# Patient Record
Sex: FEMALE | Race: BLACK OR AFRICAN AMERICAN
[De-identification: names, ages, dates, MRNs, and addresses within clinical notes are randomized per-mention and may not be internally consistent; named-entity substitution may affect disease eponyms.]

---

## 2017-08-03 PROBLEM — Z00.00 ENCOUNTER FOR PREVENTIVE HEALTH EXAMINATION: Status: ACTIVE | Noted: 2017-08-03

## 2017-09-01 ENCOUNTER — APPOINTMENT (OUTPATIENT)
Dept: OPHTHALMOLOGY | Facility: CLINIC | Age: 27
End: 2017-09-01

## 2017-09-26 ENCOUNTER — APPOINTMENT (OUTPATIENT)
Dept: OPHTHALMOLOGY | Facility: CLINIC | Age: 27
End: 2017-09-26

## 2017-12-22 ENCOUNTER — APPOINTMENT (OUTPATIENT)
Dept: OPHTHALMOLOGY | Facility: CLINIC | Age: 27
End: 2017-12-22
Payer: MEDICAID

## 2017-12-22 PROCEDURE — 92004 COMPRE OPH EXAM NEW PT 1/>: CPT

## 2018-01-26 ENCOUNTER — APPOINTMENT (OUTPATIENT)
Dept: OPHTHALMOLOGY | Facility: CLINIC | Age: 28
End: 2018-01-26
Payer: COMMERCIAL

## 2018-01-26 PROCEDURE — 92012 INTRM OPH EXAM EST PATIENT: CPT

## 2018-03-01 ENCOUNTER — APPOINTMENT (OUTPATIENT)
Dept: OPHTHALMOLOGY | Facility: CLINIC | Age: 28
End: 2018-03-01
Payer: COMMERCIAL

## 2018-03-01 PROCEDURE — 92134 CPTRZ OPH DX IMG PST SGM RTA: CPT

## 2018-03-01 PROCEDURE — 92012 INTRM OPH EXAM EST PATIENT: CPT

## 2018-03-01 RX ORDER — KETOROLAC TROMETHAMINE 5 MG/ML
0.5 SOLUTION OPHTHALMIC
Qty: 1 | Refills: 5 | Status: ACTIVE | COMMUNITY
Start: 2018-03-01 | End: 1900-01-01

## 2018-04-19 ENCOUNTER — APPOINTMENT (OUTPATIENT)
Dept: OPHTHALMOLOGY | Facility: CLINIC | Age: 28
End: 2018-04-19
Payer: COMMERCIAL

## 2018-04-19 PROCEDURE — 92134 CPTRZ OPH DX IMG PST SGM RTA: CPT

## 2018-04-19 PROCEDURE — 92012 INTRM OPH EXAM EST PATIENT: CPT

## 2018-04-27 ENCOUNTER — RX RENEWAL (OUTPATIENT)
Age: 28
End: 2018-04-27

## 2018-04-27 RX ORDER — FLUOROMETHOLONE 2.5 MG/ML
0.25 SUSPENSION/ DROPS OPHTHALMIC 4 TIMES DAILY
Qty: 1 | Refills: 5 | Status: ACTIVE | COMMUNITY
Start: 2018-04-27 | End: 1900-01-01

## 2018-05-03 ENCOUNTER — APPOINTMENT (OUTPATIENT)
Dept: OPHTHALMOLOGY | Facility: CLINIC | Age: 28
End: 2018-05-03
Payer: COMMERCIAL

## 2018-05-03 PROCEDURE — 92012 INTRM OPH EXAM EST PATIENT: CPT

## 2018-05-03 PROCEDURE — 92134 CPTRZ OPH DX IMG PST SGM RTA: CPT

## 2018-05-04 ENCOUNTER — RX RENEWAL (OUTPATIENT)
Age: 28
End: 2018-05-04

## 2018-06-07 ENCOUNTER — APPOINTMENT (OUTPATIENT)
Dept: OPHTHALMOLOGY | Facility: CLINIC | Age: 28
End: 2018-06-07

## 2018-07-23 ENCOUNTER — APPOINTMENT (OUTPATIENT)
Dept: OPHTHALMOLOGY | Facility: CLINIC | Age: 28
End: 2018-07-23
Payer: COMMERCIAL

## 2018-07-23 DIAGNOSIS — H53.002 UNSPECIFIED AMBLYOPIA, LEFT EYE: ICD-10-CM

## 2018-07-23 PROCEDURE — 92012 INTRM OPH EXAM EST PATIENT: CPT

## 2018-07-23 RX ORDER — DIFLUPREDNATE 0.5 MG/ML
0.05 EMULSION OPHTHALMIC 4 TIMES DAILY
Qty: 1 | Refills: 5 | Status: ACTIVE | COMMUNITY
Start: 2018-04-19 | End: 1900-01-01

## 2018-08-06 ENCOUNTER — APPOINTMENT (OUTPATIENT)
Dept: OPHTHALMOLOGY | Facility: CLINIC | Age: 28
End: 2018-08-06

## 2018-09-13 ENCOUNTER — APPOINTMENT (OUTPATIENT)
Dept: OPHTHALMOLOGY | Facility: CLINIC | Age: 28
End: 2018-09-13
Payer: COMMERCIAL

## 2018-09-13 PROCEDURE — 92012 INTRM OPH EXAM EST PATIENT: CPT

## 2018-12-07 ENCOUNTER — APPOINTMENT (OUTPATIENT)
Dept: OPHTHALMOLOGY | Facility: CLINIC | Age: 28
End: 2018-12-07
Payer: COMMERCIAL

## 2018-12-07 PROCEDURE — 92012 INTRM OPH EXAM EST PATIENT: CPT

## 2019-03-05 ENCOUNTER — APPOINTMENT (OUTPATIENT)
Dept: OPHTHALMOLOGY | Facility: CLINIC | Age: 29
End: 2019-03-05
Payer: COMMERCIAL

## 2019-03-05 PROCEDURE — 92014 COMPRE OPH EXAM EST PT 1/>: CPT

## 2019-03-05 RX ORDER — ACETAZOLAMIDE 500 MG/1
500 CAPSULE ORAL
Qty: 60 | Refills: 3 | Status: ACTIVE | COMMUNITY
Start: 2019-03-05 | End: 1900-01-01

## 2019-03-15 ENCOUNTER — APPOINTMENT (OUTPATIENT)
Dept: OPHTHALMOLOGY | Facility: CLINIC | Age: 29
End: 2019-03-15
Payer: COMMERCIAL

## 2019-03-15 PROCEDURE — 92012 INTRM OPH EXAM EST PATIENT: CPT

## 2019-03-15 PROCEDURE — 76514 ECHO EXAM OF EYE THICKNESS: CPT

## 2019-03-15 PROCEDURE — 92014 COMPRE OPH EXAM EST PT 1/>: CPT

## 2019-03-15 PROCEDURE — 92083 EXTENDED VISUAL FIELD XM: CPT

## 2019-03-15 PROCEDURE — 92020 GONIOSCOPY: CPT

## 2019-03-15 PROCEDURE — 92133 CPTRZD OPH DX IMG PST SGM ON: CPT

## 2019-03-29 ENCOUNTER — APPOINTMENT (OUTPATIENT)
Dept: OPHTHALMOLOGY | Facility: CLINIC | Age: 29
End: 2019-03-29
Payer: COMMERCIAL

## 2019-03-29 DIAGNOSIS — R76.8 OTHER SPECIFIED ABNORMAL IMMUNOLOGICAL FINDINGS IN SERUM: ICD-10-CM

## 2019-03-29 DIAGNOSIS — H35.351 CYSTOID MACULAR DEGENERATION, RIGHT EYE: ICD-10-CM

## 2019-03-29 DIAGNOSIS — H16.9 UNSPECIFIED KERATITIS: ICD-10-CM

## 2019-03-29 DIAGNOSIS — H40.1121 PRIMARY OPEN-ANGLE GLAUCOMA, LEFT EYE, MILD STAGE: ICD-10-CM

## 2019-03-29 DIAGNOSIS — H17.9 UNSPECIFIED CORNEAL SCAR AND OPACITY: ICD-10-CM

## 2019-03-29 DIAGNOSIS — H40.052 OCULAR HYPERTENSION, LEFT EYE: ICD-10-CM

## 2019-03-29 PROCEDURE — 92014 COMPRE OPH EXAM EST PT 1/>: CPT

## 2019-03-29 PROCEDURE — 92012 INTRM OPH EXAM EST PATIENT: CPT

## 2019-04-12 ENCOUNTER — APPOINTMENT (OUTPATIENT)
Dept: OPHTHALMOLOGY | Facility: CLINIC | Age: 29
End: 2019-04-12
Payer: COMMERCIAL

## 2019-04-12 PROCEDURE — 92012 INTRM OPH EXAM EST PATIENT: CPT

## 2019-04-19 ENCOUNTER — APPOINTMENT (OUTPATIENT)
Dept: OPHTHALMOLOGY | Facility: CLINIC | Age: 29
End: 2019-04-19
Payer: COMMERCIAL

## 2019-04-19 DIAGNOSIS — T38.0X5A GLAUCOMA SECONDARY TO DRUGS, LEFT EYE, STAGE UNSPECIFIED: ICD-10-CM

## 2019-04-19 DIAGNOSIS — H40.62X0 GLAUCOMA SECONDARY TO DRUGS, LEFT EYE, STAGE UNSPECIFIED: ICD-10-CM

## 2019-04-19 DIAGNOSIS — H20.13 CHRONIC IRIDOCYCLITIS, BILATERAL: ICD-10-CM

## 2019-04-19 PROCEDURE — 92012 INTRM OPH EXAM EST PATIENT: CPT

## 2019-04-19 RX ORDER — DORZOLAMIDE HYDROCHLORIDE TIMOLOL MALEATE 20; 5 MG/ML; MG/ML
22.3-6.8 SOLUTION/ DROPS OPHTHALMIC TWICE DAILY
Qty: 1 | Refills: 5 | Status: ACTIVE | COMMUNITY
Start: 2019-03-29 | End: 1900-01-01

## 2019-04-19 RX ORDER — BRIMONIDINE TARTRATE 1.5 MG/ML
0.15 SOLUTION/ DROPS OPHTHALMIC TWICE DAILY
Qty: 1 | Refills: 5 | Status: ACTIVE | COMMUNITY
Start: 2018-01-26 | End: 1900-01-01

## 2019-04-19 RX ORDER — LATANOPROST/PF 0.005 %
0.01 DROPS OPHTHALMIC (EYE)
Qty: 1 | Refills: 5 | Status: ACTIVE | COMMUNITY
Start: 2018-04-19 | End: 1900-01-01

## 2019-05-24 ENCOUNTER — APPOINTMENT (OUTPATIENT)
Dept: OPHTHALMOLOGY | Facility: CLINIC | Age: 29
End: 2019-05-24
Payer: COMMERCIAL

## 2019-05-24 ENCOUNTER — NON-APPOINTMENT (OUTPATIENT)
Age: 29
End: 2019-05-24

## 2019-05-24 PROCEDURE — 92012 INTRM OPH EXAM EST PATIENT: CPT

## 2019-06-12 ENCOUNTER — RX RENEWAL (OUTPATIENT)
Age: 29
End: 2019-06-12

## 2019-06-12 RX ORDER — PREDNISOLONE ACETATE 10 MG/ML
1 SUSPENSION/ DROPS OPHTHALMIC
Qty: 1 | Refills: 2 | Status: ACTIVE | COMMUNITY
Start: 2018-05-04 | End: 1900-01-01

## 2019-06-12 RX ORDER — PREDNISONE 10 MG/1
10 TABLET ORAL
Qty: 42 | Refills: 1 | Status: ACTIVE | COMMUNITY
Start: 2019-03-29 | End: 1900-01-01

## 2019-06-12 RX ORDER — OFLOXACIN 3 MG/ML
0.3 SOLUTION/ DROPS OPHTHALMIC
Qty: 1 | Refills: 3 | Status: ACTIVE | COMMUNITY
Start: 2019-06-12 | End: 1900-01-01

## 2019-06-18 ENCOUNTER — NON-APPOINTMENT (OUTPATIENT)
Age: 29
End: 2019-06-18

## 2019-06-18 ENCOUNTER — OUTPATIENT (OUTPATIENT)
Dept: OUTPATIENT SERVICES | Facility: HOSPITAL | Age: 29
LOS: 1 days | Discharge: HOME | End: 2019-06-18

## 2019-06-18 ENCOUNTER — APPOINTMENT (OUTPATIENT)
Dept: OPHTHALMOLOGY | Facility: AMBULATORY SURGERY CENTER | Age: 29
End: 2019-06-18
Payer: COMMERCIAL

## 2019-06-18 PROCEDURE — 66150 GLAUCOMA SURGERY: CPT | Mod: LT

## 2019-06-19 ENCOUNTER — NON-APPOINTMENT (OUTPATIENT)
Age: 29
End: 2019-06-19

## 2019-06-19 ENCOUNTER — APPOINTMENT (OUTPATIENT)
Dept: OPHTHALMOLOGY | Facility: CLINIC | Age: 29
End: 2019-06-19
Payer: COMMERCIAL

## 2019-06-19 PROCEDURE — 99024 POSTOP FOLLOW-UP VISIT: CPT

## 2019-06-21 ENCOUNTER — APPOINTMENT (OUTPATIENT)
Dept: OPHTHALMOLOGY | Facility: CLINIC | Age: 29
End: 2019-06-21
Payer: COMMERCIAL

## 2019-06-21 ENCOUNTER — NON-APPOINTMENT (OUTPATIENT)
Age: 29
End: 2019-06-21

## 2019-06-21 PROCEDURE — 99024 POSTOP FOLLOW-UP VISIT: CPT

## 2019-06-28 ENCOUNTER — NON-APPOINTMENT (OUTPATIENT)
Age: 29
End: 2019-06-28

## 2019-06-28 ENCOUNTER — APPOINTMENT (OUTPATIENT)
Dept: OPHTHALMOLOGY | Facility: CLINIC | Age: 29
End: 2019-06-28
Payer: COMMERCIAL

## 2019-06-28 PROCEDURE — 99024 POSTOP FOLLOW-UP VISIT: CPT

## 2019-06-28 PROCEDURE — 67515 INJECT/TREAT EYE SOCKET: CPT | Mod: 58,LT

## 2019-07-29 ENCOUNTER — APPOINTMENT (OUTPATIENT)
Dept: OPHTHALMOLOGY | Facility: CLINIC | Age: 29
End: 2019-07-29
Payer: COMMERCIAL

## 2019-07-29 ENCOUNTER — NON-APPOINTMENT (OUTPATIENT)
Age: 29
End: 2019-07-29

## 2019-07-29 PROCEDURE — 99024 POSTOP FOLLOW-UP VISIT: CPT

## 2019-08-05 ENCOUNTER — APPOINTMENT (OUTPATIENT)
Dept: OPHTHALMOLOGY | Facility: CLINIC | Age: 29
End: 2019-08-05

## 2019-08-19 ENCOUNTER — APPOINTMENT (OUTPATIENT)
Dept: OPHTHALMOLOGY | Facility: CLINIC | Age: 29
End: 2019-08-19
Payer: MEDICAID

## 2019-08-19 ENCOUNTER — NON-APPOINTMENT (OUTPATIENT)
Age: 29
End: 2019-08-19

## 2019-08-19 PROCEDURE — 99024 POSTOP FOLLOW-UP VISIT: CPT

## 2019-08-30 ENCOUNTER — APPOINTMENT (OUTPATIENT)
Dept: OPHTHALMOLOGY | Facility: CLINIC | Age: 29
End: 2019-08-30
Payer: MEDICAID

## 2019-08-30 ENCOUNTER — NON-APPOINTMENT (OUTPATIENT)
Age: 29
End: 2019-08-30

## 2019-08-30 PROCEDURE — 68200 TREAT EYELID BY INJECTION: CPT | Mod: 58,LT

## 2019-08-30 PROCEDURE — 99024 POSTOP FOLLOW-UP VISIT: CPT

## 2019-09-20 ENCOUNTER — NON-APPOINTMENT (OUTPATIENT)
Age: 29
End: 2019-09-20

## 2019-09-20 ENCOUNTER — APPOINTMENT (OUTPATIENT)
Dept: OPHTHALMOLOGY | Facility: CLINIC | Age: 29
End: 2019-09-20
Payer: MEDICAID

## 2019-09-20 PROCEDURE — 92014 COMPRE OPH EXAM EST PT 1/>: CPT

## 2019-10-04 ENCOUNTER — NON-APPOINTMENT (OUTPATIENT)
Age: 29
End: 2019-10-04

## 2019-10-04 ENCOUNTER — APPOINTMENT (OUTPATIENT)
Dept: OPHTHALMOLOGY | Facility: CLINIC | Age: 29
End: 2019-10-04
Payer: MEDICAID

## 2019-10-04 PROCEDURE — 92012 INTRM OPH EXAM EST PATIENT: CPT

## 2019-10-05 ENCOUNTER — HOSPITAL ENCOUNTER (EMERGENCY)
Dept: HOSPITAL 74 - JERFT | Age: 29
Discharge: HOME | End: 2019-10-05
Payer: COMMERCIAL

## 2019-10-05 VITALS — SYSTOLIC BLOOD PRESSURE: 100 MMHG | DIASTOLIC BLOOD PRESSURE: 73 MMHG | TEMPERATURE: 97.8 F | HEART RATE: 96 BPM

## 2019-10-05 VITALS — BODY MASS INDEX: 21.6 KG/M2

## 2019-10-05 DIAGNOSIS — D50.9: ICD-10-CM

## 2019-10-05 DIAGNOSIS — J40: Primary | ICD-10-CM

## 2019-10-05 PROCEDURE — 3E0F7GC INTRODUCTION OF OTHER THERAPEUTIC SUBSTANCE INTO RESPIRATORY TRACT, VIA NATURAL OR ARTIFICIAL OPENING: ICD-10-PCS

## 2019-10-05 RX ADMIN — IPRATROPIUM BROMIDE AND ALBUTEROL SULFATE SCH AMP: .5; 3 SOLUTION RESPIRATORY (INHALATION) at 11:53

## 2019-10-05 RX ADMIN — IPRATROPIUM BROMIDE AND ALBUTEROL SULFATE SCH AMP: .5; 3 SOLUTION RESPIRATORY (INHALATION) at 12:45

## 2019-10-05 RX ADMIN — IPRATROPIUM BROMIDE AND ALBUTEROL SULFATE SCH AMP: .5; 3 SOLUTION RESPIRATORY (INHALATION) at 12:34

## 2019-10-05 RX ADMIN — IPRATROPIUM BROMIDE AND ALBUTEROL SULFATE SCH AMP: .5; 3 SOLUTION RESPIRATORY (INHALATION) at 12:05

## 2019-10-05 NOTE — PDOC
History of Present Illness





- General


Chief Complaint: Respiratory


Stated Complaint: COUGH


Time Seen by Provider: 10/05/19 11:23


History Source: Patient


Exam Limitations: No Limitations





Past History





- Travel


Traveled outside of the country in the last 30 days: No


Close contact w/someone who was outside of country & ill: No





- Past Medical History


Allergies/Adverse Reactions: 


 Allergies











Allergy/AdvReac Type Severity Reaction Status Date / Time


 


No Known Allergies Allergy   Verified 10/05/19 11:06











Home Medications: 


Ambulatory Orders





Albuterol Sulfate Inhaler - [Ventolin HFA Inhaler -] 1 - 2 inh PO Q4H #1 

inhaler 10/05/19 


Guaifenesin AC [Robitussin AC] 10 ml PO Q8H #150 ud MDD 3 10/05/19 


Methylprednisolone [Medrol Dose Hoang] 4 mg PO ASDIR #21 tablet 10/05/19 








Anemia: Yes (IRON DEFICIENCY)





- Psycho Social/Smoking Cessation Hx


Smoking History: Never smoked


Have you smoked in the past 12 months: No


Hx Alcohol Use: No


Drug/Substance Use Hx: No


Substance Use Type: None





**Review of Systems





- Review of Systems


Able to Perform ROS?: Yes


Comments:: 





10/05/19 11:35


CONSTITUTIONAL: 


Absent: fever, chills, diaphoresis, generalized weakness, malaise, loss of 

appetite


HEENT: 


Absent: rhinorrhea, nasal congestion, throat pain, throat swelling, difficulty 

swallowing, mouth swelling, ear pain, eye pain, visual Changes


CARDIOVASCULAR: 


Absent: chest pain, loss of consciousness, palpitations, irregular heart rate, 

peripheral edema


RESPIRATORY: 


Present: productive cough, wheezing. Absent: shortness of breath, dyspnea with 

exertion, orthopnea, stridor, hemoptysis


GASTROINTESTINAL:


Absent: abdominal pain, abdominal distension, nausea, vomiting, diarrhea, 

constipation, melena, hematochezia


GENITOURINARY: 


Absent: dysuria, frequency, urgency, hesitancy, hematuria, flank pain, genital 

pain


MUSCULOSKELETAL: 


Absent: myalgia, arthralgia, joint swelling


SKIN: 


Absent: rash, itching, pallor


HEMATOLOGIC/IMMUNOLOGIC: 


Absent: easy bleeding, easy bruising, lymphadenopathy, frequent infections


ENDOCRINE:


Absent: unexplained weight gain, unexplained weight loss, heat intolerance, 

cold intolerance


NEUROLOGIC: 


Absent: headache, focal weakness or paresthesias, dizziness, unsteady gait, 

seizure, mental status changes, bladder or bowel incontinence


PSYCHIATRIC: 


Absent: anxiety, depression, suicidal or homicidal ideation, hallucinations.


Is the patient limited English proficient: No





*Physical Exam





- Vital Signs


 Last Vital Signs











Temp Pulse Resp BP Pulse Ox


 


 97.8 F   96 H  18   100/73   96 


 


 10/05/19 11:07  10/05/19 11:07  10/05/19 11:07  10/05/19 11:07  10/05/19 11:07














- Physical Exam


Comments: 





10/05/19 11:40


GENERAL:


Well developed, well nourished. Awake and alert. No acute distress.


HEENT:


Normocephalic, atraumatic. PERRLA, EOMI. No conjunctival pallor. Sclera are non-

icteric. Moist mucous membranes. Oropharynx is clear.


NECK: 


Supple. Full ROM. No JVD. Carotid pulses 2+ and symmetric, without bruits. No 

thyromegaly. No lymphadenopathy.


CARDIOVASCULAR:


Regular rate and rhythm. No murmurs, rubs, or gallops. Distal pulses are 2+ and 

symmetric. 


PULMONARY: 


No evidence of respiratory distress. Lungs with scattered expiratory wheezing. 

Wet cough observed. No rales or rhonchi.


ABDOMINAL:


Soft. Non-tender. Non-distended. No rebound or guarding. No organomegaly. 

Normoactive bowel sounds. 


MUSCULOSKELETAL 


Normal range of motion at all joints. No bony deformities or tenderness. No CVA 

tenderness.


EXTREMITIES: 


No cyanosis. No clubbing. No edema. No calf tenderness.


SKIN: 


Warm and dry. Normal capillary refill. No rashes. No jaundice. 


NEUROLOGICAL: 


Alert, awake, appropriate. Cranial nerves 2-12 intact. No deficits to light 

touch and temperature in face, upper extremities and lower extremities. No 

motor deficits in the in face, upper extremities and lower extremities. 

Normoreflexic in the upper and lower extremities. Normal speech. Toes are down-

going bilaterally. Gait is normal without ataxia.


PSYCHIATRIC: 


Cooperative. Good eye contact. Appropriate mood and affect.





Medical Decision Making





- Medical Decision Making





10/05/19 11:46


Patient is a 29-year-old female with no past medical history who presents to 

the ER today for 1 week of wet cough.  She states that she is unsure as to what 

color her sputum is as she has not been able to cough up anything.  She went to 

see her primary care doctor today who sent her to the ER due to wheezing.  She 

states that she had some fatigue early in the week however now she just has the 

cough.  Denies fevers, chills, sore throat, earache, chest pain, difficulty 

breathing, nausea, vomiting and diarrhea.  Her last menstrual cycle was 3 weeks 

ago and she denies any chance of pregnancy.





A/P: Cough


On exam patient with scattered expiratory wheezes and fair aeration of the 

bases.


Wet cough observed without sputum production


Duo nebs, steroids and chest x-ray ordered


Reevaluate





10/05/19 13:27


Lung sounds now clear at the bases. No wheezing


X-ray with no acute pathology


Likely bronchitis


DC home with supportive therapy


I discussed the physical exam findings, ancillary test results and final 

diagnoses with the patient. I answered all of the patient's questions. The 

patient was satisfied with the care received and felt comfortable with the 

discharge plan and treatment plan.  The Patient agrees to follow up with the 

primary care physician/specialist within 24-72 hours. Return precautions were 

given.





Discharge





- Discharge Information


Problems reviewed: Yes


Clinical Impression/Diagnosis: 


 Bronchitis





Condition: Stable


Disposition: HOME





- Admission


No





- Follow up/Referral


Referrals: 


Remedios Anaya [Primary Care Provider] - 





- Patient Discharge Instructions


Patient Printed Discharge Instructions:  DI for Acute Bronchitis


Additional Instructions: 


You have bronchitis.


Your chest x-ray was negative for pneumonia.


Please use the inhaler every 4 hours for the next week to help with your cough.


Continue taking the prednisone daily for the next 4 days.


You may take the Robitussin with codeine at night before bed to help with your 

cough. Do not drive or drink alcohol after taking this medication as it may 

make you sleepy.


Please follow up with your primary care doctor in 1 week if your symptoms are 

not improving.





Return to the emergency department if you have fevers, chills, worsening cough, 

chest pain, worsening shortness of breath or if you have any changes in your 

symptoms.





- Post Discharge Activity


Work/Back to School Note:  Back to Work

## 2019-11-01 ENCOUNTER — APPOINTMENT (OUTPATIENT)
Dept: OPHTHALMOLOGY | Facility: CLINIC | Age: 29
End: 2019-11-01

## 2019-12-13 ENCOUNTER — APPOINTMENT (OUTPATIENT)
Age: 29
End: 2019-12-13
Payer: MEDICARE

## 2019-12-13 ENCOUNTER — NON-APPOINTMENT (OUTPATIENT)
Age: 29
End: 2019-12-13

## 2019-12-13 PROCEDURE — 92012 INTRM OPH EXAM EST PATIENT: CPT

## 2020-02-07 ENCOUNTER — NON-APPOINTMENT (OUTPATIENT)
Age: 30
End: 2020-02-07

## 2020-02-07 ENCOUNTER — APPOINTMENT (OUTPATIENT)
Dept: OPHTHALMOLOGY | Facility: CLINIC | Age: 30
End: 2020-02-07
Payer: MEDICARE

## 2020-02-07 PROCEDURE — 92134 CPTRZ OPH DX IMG PST SGM RTA: CPT

## 2020-02-07 PROCEDURE — 92012 INTRM OPH EXAM EST PATIENT: CPT

## 2020-02-07 PROCEDURE — 92083 EXTENDED VISUAL FIELD XM: CPT

## 2020-02-09 ENCOUNTER — HOSPITAL ENCOUNTER (EMERGENCY)
Dept: HOSPITAL 74 - JERFT | Age: 30
Discharge: HOME | End: 2020-02-09
Payer: COMMERCIAL

## 2020-02-09 VITALS — DIASTOLIC BLOOD PRESSURE: 73 MMHG | HEART RATE: 111 BPM | SYSTOLIC BLOOD PRESSURE: 139 MMHG | TEMPERATURE: 98.6 F

## 2020-02-09 VITALS — BODY MASS INDEX: 20.4 KG/M2
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
## 2020-02-09 DIAGNOSIS — T78.40XA: Primary | ICD-10-CM

## 2020-02-09 PROCEDURE — 3E0233Z INTRODUCTION OF ANTI-INFLAMMATORY INTO MUSCLE, PERCUTANEOUS APPROACH: ICD-10-PCS | Performed by: STUDENT IN AN ORGANIZED HEALTH CARE EDUCATION/TRAINING PROGRAM

## 2020-02-09 NOTE — PDOC
History of Present Illness





- General


Chief Complaint: Sore Throat


Stated Complaint: THROAT PROBLEM


Time Seen by Provider: 02/09/20 18:32


History Source: Patient


Exam Limitations: No Limitations





- History of Present Illness


Initial Comments: 





02/09/20 18:48


Patient is a 29-year-old female who presents to the ED with complaint of 

stating that she feels as if her throat is closing.  She states that it started 

about 30 minutes ago.  She denies any new soaps, deodorants, body washes, 

perfumes or foods.  The last thing she ate was rice, cabbage and ox tails all 

made at home.  She has not eaten out today.  She states that this is never 

happened to her before.  She does admit to being able to swallow her own 

fluids.  She denies any shortness of breath.  She denies any lip or tongue 

swelling.





Past History





- Past Medical History


Allergies/Adverse Reactions: 


 Allergies











Allergy/AdvReac Type Severity Reaction Status Date / Time


 


No Known Allergies Allergy   Verified 02/09/20 18:29











Home Medications: 


Ambulatory Orders





Albuterol Sulfate Inhaler - [Ventolin HFA Inhaler -] 1 - 2 inh PO Q4H #1 

inhaler 10/05/19 


Guaifenesin AC [Robitussin AC] 10 ml PO Q8H #150 ud MDD 3 10/05/19 


Methylprednisolone [Medrol Dose Hoang] 4 mg PO ASDIR #21 tablet 10/05/19 


Cetirizine HCl [Zyrtec -] 10 mg PO DAILY #5 tablet 02/09/20 


Famotidine [Pepcid] 20 mg PO BID 5 Days #10 tablet 02/09/20 


predniSONE [Deltasone -] 60 mg PO DAILY #12 tablet 02/09/20 








Anemia: Yes (IRON DEFICIENCY)


COPD: No





- Psycho Social/Smoking Cessation Hx


Smoking History: Never smoked


Have you smoked in the past 12 months: No


Hx Alcohol Use: No


Drug/Substance Use Hx: Yes (marajuna)


Substance Use Type: None





**Review of Systems





- Review of Systems


Comments:: 





02/09/20 18:51


- Review of Systems


Able to Perform ROS?: Yes


Constitutional: No: Fever, Chills, Loss of Appetite, Night Sweats, Weakness


HEENTM: No: Eye Pain, Vision changes, Ear Pain, Throat Pain, Mouth Pain, 

Difficulty Swallowing, positive: throat Swelling


Respiratory: No: Cough, Shortness of Breath, Wheezing, Sputum Production


Cardiac (ROS): No: Chest Pain, Chest Tightness, Palpitations, Irregular Heart 

Beat, Edema


ABD/GI: No: Nausea, Vomiting, Abdominal Pain, Diarrhea


Musculoskeletal: No: Muscle Pain, Back Pain, Joint Pain, Muscle Weakness, Neck 

Pain


Integumentary: No: Lesions, Rash


Neurological: No: Headache, Numbness, Tingling, Weakness, Speech Difficulties





*Physical Exam





- Vital Signs


 Last Vital Signs











Temp Pulse Resp BP Pulse Ox


 


 98.6 F   111 H  18   139/73   100 


 


 02/09/20 18:26  02/09/20 18:26  02/09/20 18:26  02/09/20 18:26  02/09/20 18:26














- Physical Exam





02/09/20 18:52


- Physical Exam


General Appearance:  Nourished, Appropriately Dressed, No Distress


HEENT: EOMI, Normal Voice, No Pharyngeal Erythema, No Muffled/Hoarse voice, No 

Tonsillar Exudate, No Tonsillar Erythema, No Nasal Congestion, No Rhinorrhea, 

Hearing Grossly Normal, TMs Normal, No TM Bulging, No TM Dullness, No TM 

Erythema; no pharyngeal or tonsillar edema appreciated.  Uvula midline and 

without edema.  No posterior pharynx edema appreciated.  No swelling of the 

soft palate appreciated no stridor.  Patient tolerating her own secretions. 


Neck: Supple, No Lymphadenopathy (R), No Lymphadenopathy (L), No Rigidity, No 

Decreased range of motion


Respiratory/Chest: Lungs Clear, Normal Breath Sounds.  No Respiratory Distress, 

No Accessory Muscle Use; good air entry bilaterally.  No wheezes/rales/rhonchi.

  No adventitious lung sounds.  No retractions.


Cardiovascular: Regular Rhythm, Regular Rate, S1, S2


Gastrointestinal/Abdominal: Normal Bowel Sounds, Soft.  Non-tender, No Guarding

, No Rebound, No Rigidity


Musculoskeletal: Normal Inspection.  No Decreased Range of Motion


Extremity: Normal Capillary Refill, Normal Inspection


Integumentary:  Normal Color, Dry.  No Rash


Neurologic: CNs II-XII NML intact, Fully Oriented, Alert, Normal Mood/Affect, 

Normal Response





Medical Decision Making





- Medical Decision Making





02/09/20 18:53


Assessment: Patient is a 29-year-old female with sensation of her throat 

closing.





Plan:


-Although there are no findings of airway/throat/pharyngeal edema, we will 

treat the patient's symptoms as an allergic reaction.


-Decadron IM given


-Benadryl p.o. given


-Pepcid p.o. given


-Patient tolerated a p.o. challenge in the ED without difficulty.


-Will observe and reassess.


02/09/20 19:22


The patient states that she is feeling much better and she is eager to go home.

  She states she does not feel like she has any throat swelling at this time.  

She is tolerating her own secretions and speaking in full sentences.  Repeat 

evaluation of the patient's throat shows no pharyngeal or tonsillar edema, 

uvula midline and without edema and airway is patent.  There is no stridor.  

The patient will be discharged with prescriptions for prednisone, Pepcid and 

Zyrtec.  She has been made aware that she can also take Benadryl over-the-

counter.  She has been given strict return precautions such as difficulty 

swallowing, throat swelling, difficulty speaking, shortness of breath, tongue 

swelling or lip swelling.  She understands and agrees with this treatment and 

plan and the patient is stable for discharge.





Discharge





- Discharge Information


Problems reviewed: Yes


Clinical Impression/Diagnosis: 


 Sensation of swollen throat





Condition: Stable


Disposition: HOME





- Additional Discharge Information


Prescriptions: 


Cetirizine HCl [Zyrtec -] 10 mg PO DAILY #5 tablet


Famotidine [Pepcid] 20 mg PO BID 5 Days #10 tablet


predniSONE [Deltasone -] 60 mg PO DAILY #12 tablet





- Follow up/Referral


Referrals: 


Remedios Anaya [Primary Care Provider] - 24 hours (Follow up with your 

primary doctor tomorrow, 2/10/20)





- Patient Discharge Instructions


Patient Printed Discharge Instructions:  DI for General Allergic Reactions, DI 

for Angioedema


Additional Instructions: 


Be sure to drink plenty of fluids.  Take the medications as prescribed and 

complete the entire course of all medications.  Be sure to return immediately 

to the emergency department for difficulty swallowing, throat swelling, 

difficulty speaking, shortness of breath, tongue swelling or lip swelling.  

Follow-up with your primary doctor within 24 hours for repeat evaluation.





- Post Discharge Activity


Work/Back to School Note:  Back to Work

## 2020-05-01 ENCOUNTER — APPOINTMENT (OUTPATIENT)
Dept: OPHTHALMOLOGY | Facility: CLINIC | Age: 30
End: 2020-05-01
Payer: MEDICARE

## 2020-05-01 ENCOUNTER — NON-APPOINTMENT (OUTPATIENT)
Age: 30
End: 2020-05-01

## 2020-05-01 PROCEDURE — 99442: CPT

## 2020-07-24 ENCOUNTER — APPOINTMENT (OUTPATIENT)
Dept: OPHTHALMOLOGY | Facility: CLINIC | Age: 30
End: 2020-07-24

## 2021-06-05 ENCOUNTER — HOSPITAL ENCOUNTER (EMERGENCY)
Dept: HOSPITAL 74 - JER | Age: 31
Discharge: HOME | End: 2021-06-05
Payer: COMMERCIAL

## 2021-06-05 VITALS — TEMPERATURE: 98.6 F | HEART RATE: 101 BPM | DIASTOLIC BLOOD PRESSURE: 88 MMHG | SYSTOLIC BLOOD PRESSURE: 122 MMHG

## 2021-06-05 VITALS — BODY MASS INDEX: 24.5 KG/M2

## 2021-06-05 DIAGNOSIS — S93.401A: Primary | ICD-10-CM

## 2021-06-05 PROCEDURE — 3E0233Z INTRODUCTION OF ANTI-INFLAMMATORY INTO MUSCLE, PERCUTANEOUS APPROACH: ICD-10-PCS

## 2023-01-12 ENCOUNTER — OFFICE (OUTPATIENT)
Dept: URBAN - METROPOLITAN AREA CLINIC 121 | Facility: CLINIC | Age: 33
Setting detail: OPHTHALMOLOGY
End: 2023-01-12
Payer: COMMERCIAL

## 2023-01-12 DIAGNOSIS — H40.1123: ICD-10-CM

## 2023-01-12 DIAGNOSIS — H21.541: ICD-10-CM

## 2023-01-12 DIAGNOSIS — H25.89: ICD-10-CM

## 2023-01-12 DIAGNOSIS — H20.011: ICD-10-CM

## 2023-01-12 PROCEDURE — 99213 OFFICE O/P EST LOW 20 MIN: CPT | Performed by: OPHTHALMOLOGY

## 2023-01-12 ASSESSMENT — REFRACTION_AUTOREFRACTION
OD_AXIS: 120
OS_SPHERE: -15.50
OD_SPHERE: -8.75
OS_CYLINDER: +7.25
OS_AXIS: 90
OD_CYLINDER: +5.75

## 2023-01-12 ASSESSMENT — REFRACTION_MANIFEST
OS_AXIS: 90
OD_VA1: 20/60+2
OS_CYLINDER: +7.25
OS_SPHERE: -15.50
OD_AXIS: 120
OD_CYLINDER: +5.75
OD_SPHERE: -8.75
OS_VA1: 20/50

## 2023-01-12 ASSESSMENT — SPHEQUIV_DERIVED
OS_SPHEQUIV: -11.875
OS_SPHEQUIV: -11.875
OD_SPHEQUIV: -5.875
OD_SPHEQUIV: -5.875

## 2023-01-12 ASSESSMENT — TONOMETRY
OD_IOP_MMHG: 18
OS_IOP_MMHG: 16

## 2023-01-12 ASSESSMENT — CONFRONTATIONAL VISUAL FIELD TEST (CVF)
OS_FINDINGS: FULL
OD_FINDINGS: FULL

## 2023-01-12 ASSESSMENT — CORNEAL SURGICAL SCARRING: OS_SCARRING: STROMAL

## 2023-01-12 ASSESSMENT — REFRACTION_CURRENTRX
OD_AXIS: 119
OS_AXIS: 86
OS_OVR_VA: 20/
OD_SPHERE: -7.50
OD_OVR_VA: 20/
OS_SPHERE: -15.25
OS_CYLINDER: +6.50
OD_CYLINDER: +5.00

## 2023-01-12 ASSESSMENT — VISUAL ACUITY
OS_BCVA: 20/100
OD_BCVA: 20/50

## 2023-02-11 ENCOUNTER — HOSPITAL ENCOUNTER (EMERGENCY)
Dept: HOSPITAL 74 - JERFT | Age: 33
Discharge: HOME | End: 2023-02-11
Payer: COMMERCIAL

## 2023-02-11 VITALS
SYSTOLIC BLOOD PRESSURE: 134 MMHG | HEART RATE: 110 BPM | TEMPERATURE: 98 F | DIASTOLIC BLOOD PRESSURE: 92 MMHG | RESPIRATION RATE: 18 BRPM

## 2023-02-11 VITALS — BODY MASS INDEX: 27.4 KG/M2
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
## 2023-02-11 DIAGNOSIS — S82.831A: Primary | ICD-10-CM

## 2024-01-05 ENCOUNTER — OFFICE (OUTPATIENT)
Dept: URBAN - METROPOLITAN AREA CLINIC 30 | Facility: CLINIC | Age: 34
Setting detail: OPHTHALMOLOGY
End: 2024-01-05
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
## 2024-01-05 PROCEDURE — NO SHOW FE NO SHOW FEE: Performed by: OPHTHALMOLOGY

## 2024-01-12 ENCOUNTER — OFFICE (OUTPATIENT)
Dept: URBAN - METROPOLITAN AREA CLINIC 29 | Facility: CLINIC | Age: 34
Setting detail: OPHTHALMOLOGY
End: 2024-01-12
Payer: COMMERCIAL

## 2024-01-12 DIAGNOSIS — H35.033: ICD-10-CM

## 2024-01-12 DIAGNOSIS — H20.011: ICD-10-CM

## 2024-01-12 DIAGNOSIS — H40.1114: ICD-10-CM

## 2024-01-12 DIAGNOSIS — H40.1123: ICD-10-CM

## 2024-01-12 DIAGNOSIS — H25.89: ICD-10-CM

## 2024-01-12 DIAGNOSIS — H21.541: ICD-10-CM

## 2024-01-12 DIAGNOSIS — H17.89: ICD-10-CM

## 2024-01-12 PROCEDURE — 92020 GONIOSCOPY: CPT | Performed by: OPHTHALMOLOGY

## 2024-01-12 PROCEDURE — 99214 OFFICE O/P EST MOD 30 MIN: CPT | Performed by: OPHTHALMOLOGY

## 2024-01-12 ASSESSMENT — REFRACTION_AUTOREFRACTION
OD_AXIS: 120
OS_SPHERE: -15.50
OD_CYLINDER: +5.75
OS_CYLINDER: +7.25
OS_AXIS: 90
OD_SPHERE: -8.75

## 2024-01-12 ASSESSMENT — REFRACTION_CURRENTRX
OS_SPHERE: -15.25
OD_SPHERE: -7.50
OD_AXIS: 119
OS_AXIS: 86
OD_OVR_VA: 20/
OS_CYLINDER: +6.50
OS_OVR_VA: 20/
OD_CYLINDER: +5.00

## 2024-01-12 ASSESSMENT — REFRACTION_MANIFEST
OS_AXIS: 90
OD_AXIS: 120
OD_SPHERE: -8.75
OS_SPHERE: -15.50
OS_VA1: 20/50
OD_CYLINDER: +5.75
OS_CYLINDER: +7.25
OD_VA1: 20/60+2

## 2024-01-12 ASSESSMENT — CONFRONTATIONAL VISUAL FIELD TEST (CVF)
OS_FINDINGS: FULL
OD_FINDINGS: FULL

## 2024-01-12 ASSESSMENT — SPHEQUIV_DERIVED
OS_SPHEQUIV: -11.875
OD_SPHEQUIV: -5.875
OD_SPHEQUIV: -5.875
OS_SPHEQUIV: -11.875

## 2024-01-12 ASSESSMENT — CORNEAL SURGICAL SCARRING
OS_SCARRING: STROMAL
OD_SCARRING: STROMAL

## 2024-01-18 ENCOUNTER — OFFICE (OUTPATIENT)
Dept: URBAN - METROPOLITAN AREA CLINIC 29 | Facility: CLINIC | Age: 34
Setting detail: OPHTHALMOLOGY
End: 2024-01-18
Payer: COMMERCIAL

## 2024-01-18 ENCOUNTER — RX ONLY (RX ONLY)
Age: 34
End: 2024-01-18

## 2024-01-18 DIAGNOSIS — H35.033: ICD-10-CM

## 2024-01-18 DIAGNOSIS — H35.351: ICD-10-CM

## 2024-01-18 DIAGNOSIS — H40.1133: ICD-10-CM

## 2024-01-18 DIAGNOSIS — H25.89: ICD-10-CM

## 2024-01-18 DIAGNOSIS — H17.9: ICD-10-CM

## 2024-01-18 DIAGNOSIS — H25.041: ICD-10-CM

## 2024-01-18 DIAGNOSIS — H20.011: ICD-10-CM

## 2024-01-18 DIAGNOSIS — H16.393: ICD-10-CM

## 2024-01-18 PROBLEM — H40.1123: Status: ACTIVE | Noted: 2024-01-18

## 2024-01-18 PROBLEM — H40.1113: Status: ACTIVE | Noted: 2024-01-18

## 2024-01-18 PROCEDURE — 92134 CPTRZ OPH DX IMG PST SGM RTA: CPT | Performed by: OPHTHALMOLOGY

## 2024-01-18 PROCEDURE — 92025 CPTRIZED CORNEAL TOPOGRAPHY: CPT | Performed by: OPHTHALMOLOGY

## 2024-01-18 PROCEDURE — 92083 EXTENDED VISUAL FIELD XM: CPT | Performed by: OPHTHALMOLOGY

## 2024-01-18 PROCEDURE — 92133 CPTRZD OPH DX IMG PST SGM ON: CPT | Performed by: OPHTHALMOLOGY

## 2024-01-18 PROCEDURE — 92136 OPHTHALMIC BIOMETRY: CPT | Mod: RT | Performed by: OPHTHALMOLOGY

## 2024-01-18 PROCEDURE — 99214 OFFICE O/P EST MOD 30 MIN: CPT | Performed by: OPHTHALMOLOGY

## 2024-01-18 ASSESSMENT — REFRACTION_AUTOREFRACTION
OS_SPHERE: -15.50
OD_SPHERE: -8.75
OD_CYLINDER: +5.75
OS_AXIS: 90
OD_AXIS: 120
OS_CYLINDER: +7.25

## 2024-01-18 ASSESSMENT — REFRACTION_MANIFEST
OD_AXIS: 120
OS_VA1: 20/50
OD_SPHERE: -8.75
OD_VA1: 20/60+2
OD_CYLINDER: +5.75
OS_AXIS: 90
OS_SPHERE: -15.50
OS_CYLINDER: +7.25

## 2024-01-18 ASSESSMENT — CONFRONTATIONAL VISUAL FIELD TEST (CVF)
OS_FINDINGS: FULL
OD_FINDINGS: FULL

## 2024-01-18 ASSESSMENT — REFRACTION_CURRENTRX
OD_CYLINDER: +5.00
OD_OVR_VA: 20/
OS_CYLINDER: +6.50
OD_SPHERE: -7.50
OD_AXIS: 119
OS_AXIS: 86
OS_OVR_VA: 20/
OS_SPHERE: -15.25

## 2024-01-18 ASSESSMENT — SPHEQUIV_DERIVED
OS_SPHEQUIV: -11.875
OD_SPHEQUIV: -5.875
OD_SPHEQUIV: -5.875
OS_SPHEQUIV: -11.875

## 2024-01-18 ASSESSMENT — CORNEAL SURGICAL SCARRING
OS_SCARRING: STROMAL
OD_SCARRING: STROMAL

## 2024-02-29 ENCOUNTER — OFFICE (OUTPATIENT)
Dept: URBAN - METROPOLITAN AREA CLINIC 121 | Facility: CLINIC | Age: 34
Setting detail: OPHTHALMOLOGY
End: 2024-02-29
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
## 2024-02-29 PROCEDURE — NO SHOW FE NO SHOW FEE: Performed by: OPHTHALMOLOGY

## 2024-05-16 ENCOUNTER — OFFICE (OUTPATIENT)
Dept: URBAN - METROPOLITAN AREA CLINIC 121 | Facility: CLINIC | Age: 34
Setting detail: OPHTHALMOLOGY
End: 2024-05-16
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
## 2024-05-16 PROCEDURE — NO SHOW FE NO SHOW FEE: Performed by: OPHTHALMOLOGY

## 2024-10-03 ENCOUNTER — OFFICE (OUTPATIENT)
Dept: URBAN - METROPOLITAN AREA CLINIC 29 | Facility: CLINIC | Age: 34
Setting detail: OPHTHALMOLOGY
End: 2024-10-03
Payer: COMMERCIAL

## 2024-10-03 DIAGNOSIS — H40.1113: ICD-10-CM

## 2024-10-03 DIAGNOSIS — H25.041: ICD-10-CM

## 2024-10-03 DIAGNOSIS — H16.393: ICD-10-CM

## 2024-10-03 DIAGNOSIS — H35.351: ICD-10-CM

## 2024-10-03 DIAGNOSIS — H21.541: ICD-10-CM

## 2024-10-03 DIAGNOSIS — H40.1123: ICD-10-CM

## 2024-10-03 DIAGNOSIS — H25.89: ICD-10-CM

## 2024-10-03 DIAGNOSIS — H20.011: ICD-10-CM

## 2024-10-03 DIAGNOSIS — H17.9: ICD-10-CM

## 2024-10-03 DIAGNOSIS — H35.033: ICD-10-CM

## 2024-10-03 PROCEDURE — 92134 CPTRZ OPH DX IMG PST SGM RTA: CPT | Performed by: OPHTHALMOLOGY

## 2024-10-03 PROCEDURE — 76512 OPH US DX B-SCAN: CPT | Performed by: OPHTHALMOLOGY

## 2024-10-03 PROCEDURE — 99214 OFFICE O/P EST MOD 30 MIN: CPT | Performed by: OPHTHALMOLOGY

## 2024-10-03 ASSESSMENT — REFRACTION_MANIFEST
OS_VA1: 20/50
OD_CYLINDER: +5.75
OS_AXIS: 90
OD_SPHERE: -8.75
OD_VA1: 20/60+2
OS_CYLINDER: +7.25
OS_SPHERE: -15.50
OD_AXIS: 120

## 2024-10-03 ASSESSMENT — REFRACTION_CURRENTRX
OD_OVR_VA: 20/
OS_SPHERE: -15.25
OD_SPHERE: -7.50
OS_CYLINDER: +6.50
OS_AXIS: 86
OD_CYLINDER: +5.00
OS_OVR_VA: 20/
OD_AXIS: 119

## 2024-10-03 ASSESSMENT — CONFRONTATIONAL VISUAL FIELD TEST (CVF)
OS_FINDINGS: FULL
OD_FINDINGS: FULL

## 2024-10-03 ASSESSMENT — REFRACTION_AUTOREFRACTION
OS_AXIS: 90
OD_AXIS: 120
OD_SPHERE: -8.75
OS_SPHERE: -15.50
OD_CYLINDER: +5.75
OS_CYLINDER: +7.25

## 2024-10-03 ASSESSMENT — VISUAL ACUITY
OD_BCVA: 20/50
OS_BCVA: 20/HM

## 2024-10-03 ASSESSMENT — CORNEAL SURGICAL SCARRING
OD_SCARRING: STROMAL
OS_SCARRING: STROMAL

## 2024-10-03 ASSESSMENT — TONOMETRY
OS_IOP_MMHG: 21
OD_IOP_MMHG: 20

## 2024-10-18 ENCOUNTER — OFFICE (OUTPATIENT)
Dept: URBAN - METROPOLITAN AREA CLINIC 29 | Facility: CLINIC | Age: 34
Setting detail: OPHTHALMOLOGY
End: 2024-10-18
Payer: COMMERCIAL

## 2024-10-18 DIAGNOSIS — H17.9: ICD-10-CM

## 2024-10-18 DIAGNOSIS — H35.351: ICD-10-CM

## 2024-10-18 DIAGNOSIS — H21.541: ICD-10-CM

## 2024-10-18 DIAGNOSIS — H16.393: ICD-10-CM

## 2024-10-18 DIAGNOSIS — H35.033: ICD-10-CM

## 2024-10-18 DIAGNOSIS — H20.011: ICD-10-CM

## 2024-10-18 DIAGNOSIS — H40.1113: ICD-10-CM

## 2024-10-18 DIAGNOSIS — H25.89: ICD-10-CM

## 2024-10-18 DIAGNOSIS — H16.223: ICD-10-CM

## 2024-10-18 DIAGNOSIS — H40.1123: ICD-10-CM

## 2024-10-18 PROCEDURE — 99214 OFFICE O/P EST MOD 30 MIN: CPT | Performed by: OPHTHALMOLOGY

## 2024-10-18 PROCEDURE — 76512 OPH US DX B-SCAN: CPT | Performed by: OPHTHALMOLOGY

## 2024-10-18 ASSESSMENT — REFRACTION_AUTOREFRACTION
OS_CYLINDER: +7.25
OD_CYLINDER: +5.75
OD_AXIS: 120
OS_SPHERE: -15.50
OD_SPHERE: -8.75
OS_AXIS: 90

## 2024-10-18 ASSESSMENT — REFRACTION_CURRENTRX
OD_OVR_VA: 20/
OD_CYLINDER: +5.00
OS_CYLINDER: +6.50
OS_SPHERE: -15.25
OS_AXIS: 86
OD_AXIS: 119
OD_SPHERE: -7.50
OS_OVR_VA: 20/

## 2024-10-18 ASSESSMENT — TONOMETRY: OS_IOP_MMHG: 19

## 2024-10-18 ASSESSMENT — REFRACTION_MANIFEST
OD_CYLINDER: +5.75
OD_SPHERE: -8.75
OS_AXIS: 90
OD_AXIS: 120
OS_CYLINDER: +7.25
OS_SPHERE: -15.50
OD_VA1: 20/60+2
OS_VA1: 20/50

## 2024-10-18 ASSESSMENT — CONFRONTATIONAL VISUAL FIELD TEST (CVF)
OS_FINDINGS: FULL
OD_FINDINGS: FULL

## 2024-10-18 ASSESSMENT — VISUAL ACUITY
OD_BCVA: 20/60-1
OS_BCVA: 20/HM

## 2024-10-18 ASSESSMENT — CORNEAL SURGICAL SCARRING
OS_SCARRING: STROMAL
OD_SCARRING: STROMAL

## 2024-10-18 ASSESSMENT — SUPERFICIAL PUNCTATE KERATITIS (SPK)
OS_SPK: 2+
OD_SPK: 1+

## 2024-11-12 ENCOUNTER — RX ONLY (RX ONLY)
Age: 34
End: 2024-11-12

## 2024-11-12 ENCOUNTER — OFFICE (OUTPATIENT)
Dept: URBAN - METROPOLITAN AREA CLINIC 29 | Facility: CLINIC | Age: 34
Setting detail: OPHTHALMOLOGY
End: 2024-11-12
Payer: COMMERCIAL

## 2024-11-12 DIAGNOSIS — H20.011: ICD-10-CM

## 2024-11-12 DIAGNOSIS — H35.351: ICD-10-CM

## 2024-11-12 DIAGNOSIS — H16.393: ICD-10-CM

## 2024-11-12 DIAGNOSIS — H16.223: ICD-10-CM

## 2024-11-12 DIAGNOSIS — H17.9: ICD-10-CM

## 2024-11-12 DIAGNOSIS — H40.1113: ICD-10-CM

## 2024-11-12 DIAGNOSIS — H21.541: ICD-10-CM

## 2024-11-12 DIAGNOSIS — H40.1123: ICD-10-CM

## 2024-11-12 DIAGNOSIS — H35.033: ICD-10-CM

## 2024-11-12 DIAGNOSIS — H25.89: ICD-10-CM

## 2024-11-12 PROCEDURE — 99213 OFFICE O/P EST LOW 20 MIN: CPT | Performed by: OPHTHALMOLOGY

## 2024-11-12 PROCEDURE — 92020 GONIOSCOPY: CPT | Performed by: OPHTHALMOLOGY

## 2024-11-12 PROCEDURE — 92083 EXTENDED VISUAL FIELD XM: CPT | Performed by: OPHTHALMOLOGY

## 2024-11-12 ASSESSMENT — REFRACTION_AUTOREFRACTION
OS_CYLINDER: +8.00
OD_CYLINDER: +5.00
OS_AXIS: 090
OD_SPHERE: UNABLE
OS_SPHERE: -16.75
OD_AXIS: 099

## 2024-11-12 ASSESSMENT — CONFRONTATIONAL VISUAL FIELD TEST (CVF)
OS_FINDINGS: FULL
OD_FINDINGS: FULL

## 2024-11-12 ASSESSMENT — REFRACTION_CURRENTRX
OD_CYLINDER: +5.00
OS_SPHERE: -15.25
OS_CYLINDER: +6.50
OD_AXIS: 119
OD_OVR_VA: 20/
OS_AXIS: 86
OS_OVR_VA: 20/
OD_SPHERE: -7.50

## 2024-11-12 ASSESSMENT — SUPERFICIAL PUNCTATE KERATITIS (SPK)
OD_SPK: 1+
OS_SPK: 2+

## 2024-11-12 ASSESSMENT — REFRACTION_MANIFEST
OS_CYLINDER: +7.25
OD_VA1: 20/60+2
OS_VA1: 20/50
OD_SPHERE: -8.75
OS_AXIS: 90
OS_SPHERE: -15.50
OD_CYLINDER: +5.75
OD_AXIS: 120

## 2024-11-12 ASSESSMENT — CORNEAL SURGICAL SCARRING
OD_SCARRING: STROMAL
OS_SCARRING: STROMAL

## 2024-11-12 ASSESSMENT — VISUAL ACUITY
OD_BCVA: 20/70-1
OS_BCVA: 20/HM

## 2025-05-21 ENCOUNTER — OFFICE (OUTPATIENT)
Facility: LOCATION | Age: 35
Setting detail: OPHTHALMOLOGY
End: 2025-05-21
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
## 2025-05-21 PROCEDURE — NO SHOW FE NO SHOW FEE: Performed by: OPHTHALMOLOGY
